# Patient Record
Sex: FEMALE | Race: WHITE | Employment: PART TIME | URBAN - METROPOLITAN AREA
[De-identification: names, ages, dates, MRNs, and addresses within clinical notes are randomized per-mention and may not be internally consistent; named-entity substitution may affect disease eponyms.]

---

## 2017-03-06 ENCOUNTER — LAB ENCOUNTER (OUTPATIENT)
Dept: LAB | Age: 27
End: 2017-03-06
Attending: FAMILY MEDICINE
Payer: COMMERCIAL

## 2017-03-06 ENCOUNTER — OFFICE VISIT (OUTPATIENT)
Dept: FAMILY MEDICINE CLINIC | Facility: CLINIC | Age: 27
End: 2017-03-06

## 2017-03-06 VITALS
TEMPERATURE: 99 F | RESPIRATION RATE: 18 BRPM | SYSTOLIC BLOOD PRESSURE: 112 MMHG | HEART RATE: 68 BPM | DIASTOLIC BLOOD PRESSURE: 66 MMHG | WEIGHT: 125 LBS | BODY MASS INDEX: 24 KG/M2

## 2017-03-06 DIAGNOSIS — R19.7 DIARRHEA, UNSPECIFIED TYPE: ICD-10-CM

## 2017-03-06 DIAGNOSIS — K58.0 IRRITABLE BOWEL SYNDROME WITH DIARRHEA: ICD-10-CM

## 2017-03-06 DIAGNOSIS — R10.12 LUQ ABDOMINAL PAIN: ICD-10-CM

## 2017-03-06 DIAGNOSIS — R10.12 LUQ ABDOMINAL PAIN: Primary | ICD-10-CM

## 2017-03-06 LAB
ALBUMIN SERPL-MCNC: 4.4 G/DL (ref 3.5–4.8)
ALP LIVER SERPL-CCNC: 55 U/L (ref 37–98)
ALT SERPL-CCNC: 17 U/L (ref 14–54)
AST SERPL-CCNC: 12 U/L (ref 15–41)
BASOPHILS # BLD AUTO: 0.04 X10(3) UL (ref 0–0.1)
BASOPHILS NFR BLD AUTO: 0.7 %
BILIRUB DIRECT SERPL-MCNC: 0.1 MG/DL (ref 0.1–0.5)
BILIRUB SERPL-MCNC: 0.3 MG/DL (ref 0.1–2)
BUN BLD-MCNC: 16 MG/DL (ref 8–20)
CALCIUM BLD-MCNC: 8.7 MG/DL (ref 8.3–10.3)
CHLORIDE: 106 MMOL/L (ref 101–111)
CO2: 28 MMOL/L (ref 22–32)
CREAT BLD-MCNC: 0.78 MG/DL (ref 0.55–1.02)
EOSINOPHIL # BLD AUTO: 0.11 X10(3) UL (ref 0–0.3)
EOSINOPHIL NFR BLD AUTO: 1.9 %
ERYTHROCYTE [DISTWIDTH] IN BLOOD BY AUTOMATED COUNT: 12.1 % (ref 11.5–16)
GLUCOSE BLD-MCNC: 68 MG/DL (ref 70–99)
HAV IGM SER QL: NONREACTIVE
HBV CORE IGM SER QL: NONREACTIVE
HBV SURFACE AG SERPL QL IA: NONREACTIVE
HCT VFR BLD AUTO: 41.4 % (ref 34–50)
HEPATITIS C VIRUS AB INTERPRETATION: NONREACTIVE
HGB BLD-MCNC: 13.7 G/DL (ref 12–16)
IMMATURE GRANULOCYTE COUNT: 0.01 X10(3) UL (ref 0–1)
IMMATURE GRANULOCYTE RATIO %: 0.2 %
IMMUNOGLOBULIN A: 268 MG/DL (ref 70–312)
LIPASE: 143 U/L (ref 73–393)
LYMPHOCYTES # BLD AUTO: 2.25 X10(3) UL (ref 0.9–4)
LYMPHOCYTES NFR BLD AUTO: 39.4 %
M PROTEIN MFR SERPL ELPH: 7.7 G/DL (ref 6.1–8.3)
MCH RBC QN AUTO: 30.9 PG (ref 27–33.2)
MCHC RBC AUTO-ENTMCNC: 33.1 G/DL (ref 31–37)
MCV RBC AUTO: 93.2 FL (ref 81–100)
MONOCYTES # BLD AUTO: 0.45 X10(3) UL (ref 0.1–0.6)
MONOCYTES NFR BLD AUTO: 7.9 %
NEUTROPHIL ABS PRELIM: 2.85 X10 (3) UL (ref 1.3–6.7)
NEUTROPHILS # BLD AUTO: 2.85 X10(3) UL (ref 1.3–6.7)
NEUTROPHILS NFR BLD AUTO: 49.9 %
PLATELET # BLD AUTO: 323 10(3)UL (ref 150–450)
POTASSIUM SERPL-SCNC: 3.7 MMOL/L (ref 3.6–5.1)
RBC # BLD AUTO: 4.44 X10(6)UL (ref 3.8–5.1)
RED CELL DISTRIBUTION WIDTH-SD: 42 FL (ref 35.1–46.3)
SODIUM SERPL-SCNC: 141 MMOL/L (ref 136–144)
WBC # BLD AUTO: 5.7 X10(3) UL (ref 4–13)

## 2017-03-06 PROCEDURE — 85025 COMPLETE CBC W/AUTO DIFF WBC: CPT

## 2017-03-06 PROCEDURE — 86255 FLUORESCENT ANTIBODY SCREEN: CPT

## 2017-03-06 PROCEDURE — 83516 IMMUNOASSAY NONANTIBODY: CPT

## 2017-03-06 PROCEDURE — 36415 COLL VENOUS BLD VENIPUNCTURE: CPT

## 2017-03-06 PROCEDURE — 82784 ASSAY IGA/IGD/IGG/IGM EACH: CPT

## 2017-03-06 PROCEDURE — 99214 OFFICE O/P EST MOD 30 MIN: CPT | Performed by: FAMILY MEDICINE

## 2017-03-06 PROCEDURE — 82248 BILIRUBIN DIRECT: CPT

## 2017-03-06 PROCEDURE — 86003 ALLG SPEC IGE CRUDE XTRC EA: CPT

## 2017-03-06 PROCEDURE — 80053 COMPREHEN METABOLIC PANEL: CPT

## 2017-03-06 PROCEDURE — 83690 ASSAY OF LIPASE: CPT

## 2017-03-06 PROCEDURE — 80074 ACUTE HEPATITIS PANEL: CPT

## 2017-03-06 RX ORDER — DEXTROAMPHETAMINE SACCHARATE, AMPHETAMINE ASPARTATE, DEXTROAMPHETAMINE SULFATE AND AMPHETAMINE SULFATE 5; 5; 5; 5 MG/1; MG/1; MG/1; MG/1
20 TABLET ORAL DAILY
Qty: 30 TABLET | Refills: 0 | Status: SHIPPED | OUTPATIENT
Start: 2017-03-06 | End: 2017-04-05

## 2017-03-06 RX ORDER — DEXTROAMPHETAMINE SACCHARATE, AMPHETAMINE ASPARTATE, DEXTROAMPHETAMINE SULFATE AND AMPHETAMINE SULFATE 5; 5; 5; 5 MG/1; MG/1; MG/1; MG/1
20 TABLET ORAL DAILY
Qty: 30 TABLET | Refills: 0 | Status: SHIPPED | OUTPATIENT
Start: 2017-04-05 | End: 2017-05-05

## 2017-03-06 RX ORDER — DEXTROAMPHETAMINE SACCHARATE, AMPHETAMINE ASPARTATE, DEXTROAMPHETAMINE SULFATE AND AMPHETAMINE SULFATE 5; 5; 5; 5 MG/1; MG/1; MG/1; MG/1
20 TABLET ORAL DAILY
Qty: 30 TABLET | Refills: 0 | Status: SHIPPED | OUTPATIENT
Start: 2017-05-05 | End: 2017-06-04

## 2017-03-06 RX ORDER — DEXTROAMPHETAMINE SACCHARATE, AMPHETAMINE ASPARTATE, DEXTROAMPHETAMINE SULFATE AND AMPHETAMINE SULFATE 5; 5; 5; 5 MG/1; MG/1; MG/1; MG/1
20 TABLET ORAL DAILY
COMMUNITY
End: 2018-06-13 | Stop reason: ALTCHOICE

## 2017-03-06 RX ORDER — DICYCLOMINE HYDROCHLORIDE 10 MG/1
10 CAPSULE ORAL 3 TIMES DAILY PRN
Qty: 40 CAPSULE | Refills: 3 | Status: SHIPPED | OUTPATIENT
Start: 2017-03-06 | End: 2017-03-16

## 2017-03-06 NOTE — PATIENT INSTRUCTIONS
Treating Diarrhea    Diarrhea happens when you have loose, watery, or frequent bowel movements. It is a common problem with many causes. Most cases of diarrhea clear up on their own. But certain cases may need treatment.  Be sure to see your healthcare pr © 9106-7481 [address], [address] [place]. All rights reserved. This information is not intended as a substitute for professional medical care. Always follow your healthcare professional's instructions.         Vira Lee · You may use acetaminophen or NSAID medicines like ibuprofen or naproxen to reduce pain and fever. Don’t use these if you have chronic liver or kidney disease, or ever had a stomach ulcer or gastrointestinal bleeding.  Don't use NSAID medicines if you are · Don’t force yourself to eat, especially if you are having cramping, vomiting, or diarrhea. Don’t eat large amounts at a time, even if you are hungry. · If you eat, avoid fatty, greasy, spicy, or fried foods.   · Don’t eat dairy foods or drink milk if you · Abdominal pain that gets worse  · Constant lower right abdominal pain  · Continued vomiting and inability to keep liquids down  · Diarrhea more than 5 times a day  · Blood in vomit or stool  · Dark urine or no urine for 8 hours, dry mouth and tongue, tir In general, a low-fiber diet means having fewer than 13 grams of fiber a day. Your provider may give you a list of things you can and can’t eat or drink. Read food labels. Choose foods and drinks that have as close to zero grams of fiber as possible.  Here Meats and protein (4 to 6 ounces daily)  · What to choose: tender, well-cooked meat, including ground meat, poultry, and fish; eggs; tofu; creamy peanut butter  · What to avoid: tough, chewy meat with gristle; peas, including split, yellow, and black-eyed;

## 2017-03-06 NOTE — PROGRESS NOTES
Elizabeth Braun is a 23year old female who presents for diarrhea. HPI:   Pt complains of diarrhea, watery, no blood,whole life, mild , no melena , associated with abdominal cramps. No travel, no sick contact, no suspicious food, no antibiotics. Worse w taste  LUNGS: denies shortness of breath with exertion  CARDIOVASCULAR: denies chest pain on exertion  GI: as above. :no dysuria.   MUSCULOSKELETAL: no myalgia  NEURO: denies headaches      EXAM:   /66 mmHg  Pulse 68  Temp(Src) 98.7 °F (37.1 °C) (O US ABDOMEN COMPLETE (CPT=76700); Future  -     INSURE FECAL GLOBIN by IMMUNOASSAY [[id number]]; Future    Irritable bowel syndrome with diarrhea  -     Dicyclomine HCl 10 MG Oral Cap; Take 1 capsule (10 mg total) by mouth 3 (three) times daily as needed.   -

## 2017-03-07 ENCOUNTER — LABORATORY ENCOUNTER (OUTPATIENT)
Dept: LAB | Age: 27
End: 2017-03-07
Attending: FAMILY MEDICINE
Payer: COMMERCIAL

## 2017-03-07 DIAGNOSIS — K58.0 IRRITABLE BOWEL SYNDROME WITH DIARRHEA: ICD-10-CM

## 2017-03-07 DIAGNOSIS — R19.7 DIARRHEA, UNSPECIFIED TYPE: ICD-10-CM

## 2017-03-07 DIAGNOSIS — R10.12 LUQ ABDOMINAL PAIN: ICD-10-CM

## 2017-03-07 PROCEDURE — 87046 STOOL CULTR AEROBIC BACT EA: CPT

## 2017-03-07 PROCEDURE — 87269 GIARDIA AG IF: CPT

## 2017-03-07 PROCEDURE — 87177 OVA AND PARASITES SMEARS: CPT

## 2017-03-07 PROCEDURE — 87209 SMEAR COMPLEX STAIN: CPT

## 2017-03-07 PROCEDURE — 87045 FECES CULTURE AEROBIC BACT: CPT

## 2017-03-07 PROCEDURE — 87015 SPECIMEN INFECT AGNT CONCNTJ: CPT

## 2017-03-07 PROCEDURE — 87427 SHIGA-LIKE TOXIN AG IA: CPT

## 2017-03-08 ENCOUNTER — APPOINTMENT (OUTPATIENT)
Dept: LAB | Age: 27
End: 2017-03-08
Attending: FAMILY MEDICINE
Payer: COMMERCIAL

## 2017-03-08 DIAGNOSIS — K58.0 IRRITABLE BOWEL SYNDROME WITH DIARRHEA: ICD-10-CM

## 2017-03-08 DIAGNOSIS — R19.7 DIARRHEA, UNSPECIFIED TYPE: ICD-10-CM

## 2017-03-08 DIAGNOSIS — R10.12 LUQ ABDOMINAL PAIN: ICD-10-CM

## 2017-03-08 LAB
GLIADIN PEPTIDE ANTIBODY, IGA DEAMIDATED: 5 UNITS
GLIADIN PEPTIDE ANTIBODY, IGG DEAMIDATED: 3 UNITS

## 2017-03-08 PROCEDURE — 82272 OCCULT BLD FECES 1-3 TESTS: CPT

## 2017-03-08 PROCEDURE — 87338 HPYLORI STOOL AG IA: CPT

## 2017-03-09 ENCOUNTER — TELEPHONE (OUTPATIENT)
Dept: FAMILY MEDICINE CLINIC | Facility: CLINIC | Age: 27
End: 2017-03-09

## 2017-03-09 LAB
ALLERGEN,  SHRIMP IGE: <0.1 KU/L
ALLERGEN, CLAM IGE: <0.1 KU/L
ALLERGEN, CODFISH: <0.1 KU/L
ALLERGEN, CORN IGE: <0.1 KU/L
ALLERGEN, EGG WHITE IGE: <0.1 KU/L
ALLERGEN, MILK (COW) IGE: <0.1 KU/L
ALLERGEN, PEANUT IGE: <0.1 KU/L
ALLERGEN, SCALLOP IGE: <0.1 KU/L
ALLERGEN, SOYBEAN IGE: <0.1 KU/L
ALLERGEN, WALNUT/BLACK WALNUT: <0.1 KU/L
ALLERGEN, WHEAT IGE: <0.1 KU/L
IMMUNOGLOBULIN E: 16 KU/L
TISSUE TRANSGLUTAMINASE AB,IGA: 3.7 U/ML (ref ?–15)

## 2017-03-09 NOTE — TELEPHONE ENCOUNTER
[address] [address] Rosario Mcallister for note.  Ok to see:    [address]  Gastroenterology    [address], [address]  [address]  [place], [address] [place] [address]    [address] [facility],[place] [address], #

## 2017-03-09 NOTE — TELEPHONE ENCOUNTER
Called patient and spoke with her. Advised patient of results below. Patient states understanding. Patient states that she also needs a note for work today. Patient states that she called in today due to her stomach pain.   Please advise if okay for wor

## 2017-03-10 ENCOUNTER — HOSPITAL ENCOUNTER (OUTPATIENT)
Dept: ULTRASOUND IMAGING | Age: 27
Discharge: HOME | End: 2017-03-10
Attending: FAMILY MEDICINE
Payer: COMMERCIAL

## 2017-03-10 DIAGNOSIS — R19.7 DIARRHEA, UNSPECIFIED TYPE: ICD-10-CM

## 2017-03-10 DIAGNOSIS — K58.0 IRRITABLE BOWEL SYNDROME WITH DIARRHEA: ICD-10-CM

## 2017-03-10 DIAGNOSIS — R10.12 LUQ ABDOMINAL PAIN: ICD-10-CM

## 2017-03-10 LAB
HELICOBACTER PYLORI AG, FECAL: NEGATIVE
OVA AND PARASITE, TRICHROME STAIN: NEGATIVE
OVA AND PARASITE, WET MOUNT: NEGATIVE

## 2017-03-10 PROCEDURE — 76700 US EXAM ABDOM COMPLETE: CPT

## 2017-03-10 NOTE — TELEPHONE ENCOUNTER
Pt came in to  work note. Note & Dr. Michael Peck office info given to pt. All questions answered, pt expresses understanding.

## 2018-02-08 ENCOUNTER — TELEPHONE (OUTPATIENT)
Dept: FAMILY MEDICINE CLINIC | Facility: CLINIC | Age: 28
End: 2018-02-08

## 2018-02-08 NOTE — TELEPHONE ENCOUNTER
Russel was in to see doctor fr her Adderal and is calling stating that someone needs to call her insurance company and state that patient needs this medication.  I said that would be a PA form and she said \"no\" this happened last time and someone just roshni

## 2018-02-08 NOTE — PROGRESS NOTES
Isaiah Sheldon is a 33year old female. Patient presents with:  Medication Request: f/u ADHD      HPI:   Patient has been struggling at work. Finds it difficult to concentrate so tough completing tasks. Feels that job may be in danger.   Has previou supple  LUNGS: clear to auscultation  CARDIO: RRR without murmur  GI: good BS's,no masses, HSM or tenderness  EXTREMITIES: no cyanosis, clubbing or edema  PSYCH: normal mood and affect, no hallucinations, no SI, no HI. Normal insight/judjment.       ASSESSME

## 2018-02-12 NOTE — TELEPHONE ENCOUNTER
Message left for pt to call office back to confirm which pharmacy she took her Adderall prescriptions to so I can call then to proceed with her Adderall PA. [facility] called, pt has not filled Adderall since 2/2017.

## 2018-02-26 NOTE — TELEPHONE ENCOUNTER
I spoke to [facility].  PA phone # is [phone]. Pharmacy ID [id number]. Re Mendez called to initiate PA for Adderall. I spoke with Van Jensen, she will fax me form to complete PA. Will await form.

## 2018-02-26 NOTE — TELEPHONE ENCOUNTER
Patient called back stating she brought the scripts to [facility] in [place] on [address] She states she called back about this already and that the pharmacy has faxed us 4 times for approval.     [phone]- Patient can talk on this number,.

## 2018-02-28 NOTE — TELEPHONE ENCOUNTER
Fax received from [email or web address] is approved 2/27/18-2/27/19. [facility] notified of this & request they fill pt's Rx for her.

## 2018-05-03 RX ORDER — DEXTROAMPHETAMINE SACCHARATE, AMPHETAMINE ASPARTATE, DEXTROAMPHETAMINE SULFATE AND AMPHETAMINE SULFATE 5; 5; 5; 5 MG/1; MG/1; MG/1; MG/1
20 TABLET ORAL DAILY
Qty: 30 TABLET | Refills: 0 | Status: SHIPPED | OUTPATIENT
Start: 2018-05-03 | End: 2018-06-05

## 2018-05-03 NOTE — TELEPHONE ENCOUNTER
Pt wants to know if  can refill her adderall, she has 3 more pills left till Sunday. Pt had an appt. For tomorrow but we had to cancel. Please call Pt on her cell if Dr. Lourdes Stover can refill until next appt.

## 2018-06-05 RX ORDER — DEXTROAMPHETAMINE SACCHARATE, AMPHETAMINE ASPARTATE, DEXTROAMPHETAMINE SULFATE AND AMPHETAMINE SULFATE 5; 5; 5; 5 MG/1; MG/1; MG/1; MG/1
20 TABLET ORAL DAILY
Qty: 30 TABLET | Refills: 0 | Status: SHIPPED | OUTPATIENT
Start: 2018-06-05 | End: 2018-07-05

## 2018-06-05 NOTE — TELEPHONE ENCOUNTER
Called the patient and left a message on the voice mail informing that the prescription is ready for .

## 2018-06-05 NOTE — TELEPHONE ENCOUNTER
Patient needs a refill of Adderall. Patient has an appointment on 6/13/18 but is out of the medication. Please advise.

## 2018-06-13 NOTE — PROGRESS NOTES
Kay Canada is a 33year old female. Patient presents with:  ADD: f/u      HPI:   Patient returns for recheck of ADHD treatment. Has been using the stimulant medication on a regular basis. Feels the medication has been helping improve focus.    Is loss, tremor, and anxiety. 15 min cousneling. Patient understands and agrees to the above plan. Signed Prescriptions Disp Refills    Amphetamine Sulfate 10 MG Oral Tab 60 tablet 0      Sig: Take 1 tablet by mouth 2 (two) times daily.          Patient un

## 2018-06-14 ENCOUNTER — TELEPHONE (OUTPATIENT)
Dept: FAMILY MEDICINE CLINIC | Facility: CLINIC | Age: 28
End: 2018-06-14

## 2018-06-14 NOTE — TELEPHONE ENCOUNTER
Pt called stating that she rec'd the wrong rx yesterday She needs amphetamine-dextroamphetamine (ADDERALL) 20 MG Oral Tab   Pt states they don't make the 20mg anymore so she needs (2) 10mg. Please call when ready for .

## 2018-06-14 NOTE — TELEPHONE ENCOUNTER
Patient received prescription for Adderall 20mg on June 5th. I spoke with Darryle Falter at [address] [place] [address] and this was filled and picked up by patient on June 8th.  Patient received Adderall 10mg to be taken BID that she tried to fill but they gave her the prescription antonio

## 2018-06-17 NOTE — TELEPHONE ENCOUNTER
Patient came in to  Rx, but was informed that she never called back. Patient stated she called back, but office was closed.   Told the patient I would make a note of her coming in and advised her to call the office 6/18, Monday when the family pract

## 2018-07-01 ENCOUNTER — OFFICE VISIT (OUTPATIENT)
Dept: FAMILY MEDICINE CLINIC | Facility: CLINIC | Age: 28
End: 2018-07-01

## 2018-07-01 VITALS
SYSTOLIC BLOOD PRESSURE: 122 MMHG | RESPIRATION RATE: 20 BRPM | DIASTOLIC BLOOD PRESSURE: 52 MMHG | BODY MASS INDEX: 26 KG/M2 | WEIGHT: 135 LBS | TEMPERATURE: 99 F | HEART RATE: 92 BPM | OXYGEN SATURATION: 98 %

## 2018-07-01 DIAGNOSIS — J01.00 ACUTE NON-RECURRENT MAXILLARY SINUSITIS: Primary | ICD-10-CM

## 2018-07-01 PROCEDURE — 99213 OFFICE O/P EST LOW 20 MIN: CPT | Performed by: NURSE PRACTITIONER

## 2018-07-01 RX ORDER — AMOXICILLIN AND CLAVULANATE POTASSIUM 875; 125 MG/1; MG/1
1 TABLET, FILM COATED ORAL 2 TIMES DAILY
Qty: 14 TABLET | Refills: 0 | Status: SHIPPED | OUTPATIENT
Start: 2018-07-01 | End: 2018-07-08

## 2018-07-01 NOTE — PROGRESS NOTES
CHIEF COMPLAINT:   Patient presents with:  Cough: post nasal drip, sinus pain [id number]    HPI:   Emily Melendrez is a 33year old female who presents for sinus congestion for  10  days. Symptoms have been worsening since onset.  Sinus congestion/pain is desc /52   Pulse 92   Temp 98.5 °F (36.9 °C) (Oral)   Resp 20   Wt 135 lb   LMP 06/01/2018   SpO2 98%   Breastfeeding?  Unknown   BMI 26.37 kg/m²   GENERAL: well developed, well nourished,in no apparent distress  SKIN: no rashes,no suspicious lesions  HEAD The sinuses are air-filled spaces within the bones of the face. They connect to the inside of the nose. Sinusitis is an inflammation of the tissue lining the sinus cavity. Sinus inflammation can occur during a cold.  It can also be due to allergies to polle · Do not use nasal rinses or irrigation during an acute sinus infection, unless told to by your health care provider. Rinsing may spread the infection to other sinuses.   · Use acetaminophen or ibuprofen to control pain, unless another pain medicine was pre

## 2018-07-12 ENCOUNTER — APPOINTMENT (OUTPATIENT)
Dept: LAB | Age: 28
End: 2018-07-12
Attending: FAMILY MEDICINE
Payer: COMMERCIAL

## 2018-07-12 ENCOUNTER — OFFICE VISIT (OUTPATIENT)
Dept: FAMILY MEDICINE CLINIC | Facility: CLINIC | Age: 28
End: 2018-07-12

## 2018-07-12 VITALS
SYSTOLIC BLOOD PRESSURE: 100 MMHG | HEIGHT: 60 IN | HEART RATE: 73 BPM | WEIGHT: 132 LBS | BODY MASS INDEX: 25.91 KG/M2 | OXYGEN SATURATION: 97 % | RESPIRATION RATE: 16 BRPM | TEMPERATURE: 99 F | DIASTOLIC BLOOD PRESSURE: 60 MMHG

## 2018-07-12 DIAGNOSIS — Z13.0 SCREENING FOR ENDOCRINE, NUTRITIONAL, METABOLIC AND IMMUNITY DISORDER: Primary | ICD-10-CM

## 2018-07-12 DIAGNOSIS — Z13.21 SCREENING FOR ENDOCRINE, NUTRITIONAL, METABOLIC AND IMMUNITY DISORDER: Primary | ICD-10-CM

## 2018-07-12 DIAGNOSIS — F98.8 ATTENTION DEFICIT DISORDER (ADD) WITHOUT HYPERACTIVITY: ICD-10-CM

## 2018-07-12 DIAGNOSIS — Z13.29 SCREENING FOR ENDOCRINE, NUTRITIONAL, METABOLIC AND IMMUNITY DISORDER: Primary | ICD-10-CM

## 2018-07-12 DIAGNOSIS — Z13.228 SCREENING FOR ENDOCRINE, NUTRITIONAL, METABOLIC AND IMMUNITY DISORDER: Primary | ICD-10-CM

## 2018-07-12 LAB
BARBITURATES URINE: NEGATIVE
BENZODIAZEPINES URINE: NEGATIVE
CANNABINOID URINE: NEGATIVE
COCAINE URINE: NEGATIVE
ETHYL ALCOHOL, QUALITATIVE: NEGATIVE
OPIATE URINE: NEGATIVE
PCP URINE: NEGATIVE

## 2018-07-12 PROCEDURE — 99214 OFFICE O/P EST MOD 30 MIN: CPT | Performed by: FAMILY MEDICINE

## 2018-07-12 PROCEDURE — 80307 DRUG TEST PRSMV CHEM ANLYZR: CPT | Performed by: FAMILY MEDICINE

## 2018-07-12 RX ORDER — AMPHETAMINE SULFATE 10 MG/1
TABLET ORAL
Qty: 60 TABLET | Refills: 0 | Status: CANCELLED | OUTPATIENT
Start: 2018-07-12 | End: 2018-08-11

## 2018-07-12 RX ORDER — DEXTROAMPHETAMINE SACCHARATE, AMPHETAMINE ASPARTATE, DEXTROAMPHETAMINE SULFATE AND AMPHETAMINE SULFATE 2.5; 2.5; 2.5; 2.5 MG/1; MG/1; MG/1; MG/1
10 TABLET ORAL 2 TIMES DAILY
Qty: 60 TABLET | Refills: 0 | Status: SHIPPED | OUTPATIENT
Start: 2018-08-11 | End: 2018-07-19

## 2018-07-12 RX ORDER — DEXTROAMPHETAMINE SACCHARATE, AMPHETAMINE ASPARTATE, DEXTROAMPHETAMINE SULFATE AND AMPHETAMINE SULFATE 2.5; 2.5; 2.5; 2.5 MG/1; MG/1; MG/1; MG/1
10 TABLET ORAL 2 TIMES DAILY
Qty: 60 TABLET | Refills: 0 | Status: SHIPPED | OUTPATIENT
Start: 2018-07-12 | End: 2018-07-19

## 2018-07-12 RX ORDER — DEXTROAMPHETAMINE SACCHARATE, AMPHETAMINE ASPARTATE, DEXTROAMPHETAMINE SULFATE AND AMPHETAMINE SULFATE 2.5; 2.5; 2.5; 2.5 MG/1; MG/1; MG/1; MG/1
10 TABLET ORAL 2 TIMES DAILY
Qty: 60 TABLET | Refills: 0 | Status: SHIPPED | OUTPATIENT
Start: 2018-09-10 | End: 2018-07-19

## 2018-07-12 RX ORDER — AMPHETAMINE SULFATE 10 MG/1
10 TABLET ORAL 2 TIMES DAILY
Qty: 60 TABLET | Refills: 0 | Status: CANCELLED | OUTPATIENT
Start: 2018-07-12 | End: 2018-08-11

## 2018-07-12 RX ORDER — AMPHETAMINE SULFATE 10 MG/1
1 TABLET ORAL 2 TIMES DAILY
Qty: 60 TABLET | Refills: 0 | Status: CANCELLED | OUTPATIENT
Start: 2018-07-12 | End: 2018-08-11

## 2018-07-12 NOTE — PROGRESS NOTES
Chucky Beck is a 33year old female. Patient presents with:  ADD: f/u      HPI:   Patient returns for recheck of ADHD treatment. Has been using the stimulant medication on a regular basis. Feels the medication has been helping improve focus.    Is edema  PSYCH: normal mood and affect, no hallucinations, no SI, no HI. Normal insight/judjment. ASSESSMENT AND PLAN:   ADHD -stable.   Side effects and risks of medication explained in detail including but not limited to anorexia, weight loss, tremor, a

## 2018-07-16 ENCOUNTER — TELEPHONE (OUTPATIENT)
Dept: FAMILY MEDICINE CLINIC | Facility: CLINIC | Age: 28
End: 2018-07-16

## 2018-11-05 ENCOUNTER — OFFICE VISIT (OUTPATIENT)
Dept: FAMILY MEDICINE CLINIC | Facility: CLINIC | Age: 28
End: 2018-11-05
Payer: COMMERCIAL

## 2018-11-05 VITALS
DIASTOLIC BLOOD PRESSURE: 62 MMHG | OXYGEN SATURATION: 99 % | HEART RATE: 64 BPM | SYSTOLIC BLOOD PRESSURE: 102 MMHG | TEMPERATURE: 98 F | RESPIRATION RATE: 20 BRPM | BODY MASS INDEX: 25.91 KG/M2 | HEIGHT: 60 IN | WEIGHT: 132 LBS

## 2018-11-05 DIAGNOSIS — J06.9 VIRAL URI: Primary | ICD-10-CM

## 2018-11-05 DIAGNOSIS — H92.03 EAR PAIN, BILATERAL: ICD-10-CM

## 2018-11-05 PROCEDURE — 99213 OFFICE O/P EST LOW 20 MIN: CPT | Performed by: PHYSICIAN ASSISTANT

## 2018-11-05 NOTE — PROGRESS NOTES
CHIEF COMPLAINT:   Patient presents with:  Ear Pain    HPI:   Marychuy Mchugh is a 33year old female who presents to clinic today with complaints of bilateral ear pain/\"burning\". Has had for 2  days. Home treatments include: none.   Needs note for wor EARS: Tragus non tender on palpation bilaterally. External auditory canals healthy. Right TM: pearly, no bulging, no retraction, no effusion, bony landmarks sharp. Left TM: pearly, no bulging, no retraction, no effusion, bony landmarks sharp.   NOSE: nostr · You may use acetaminophen or ibuprofen to control pain and fever, unless another medicine was prescribed. If you have chronic liver or kidney disease, have ever had a stomach ulcer or gastrointestinal bleeding, or are taking blood-thinning medicines, elsie Earaches can happen without an infection. This occurs when air and fluid build up behind the eardrum causing a feeling of fullness and discomfort and reduced hearing. This is called otitis media with effusion (OME) or serous otitis media.  It means there is · You may use over-the-counter decongestants such as phenylephrine or pseudoephedrine. But they are not always helpful. Don't use nasal spray decongestants more than 3 days. Longer use can make congestion worse.  Prescription nasal sprays from your doctor d

## 2018-11-05 NOTE — PATIENT INSTRUCTIONS
Viral Upper Respiratory Illness (Adult)  You have a viral upper respiratory illness (URI), which is another term for the common cold. This illness is contagious during the first few days. It is spread through the air by coughing and sneezing.  It may also Call your healthcare provider right away if any of these occur:  · Cough with lots of colored sputum (mucus)  · Severe headache; face, neck, or ear pain  · Difficulty swallowing due to throat pain  · Fever of 100.4°F (38°C) or higher, or as directed by you If your OME doesn't improve after 3 months, surgery may be used to drain the fluid and insert a small tube in the eardrum to allow continued drainage.   Because the middle ear fluid can become infected, it is important to watch for signs of an ear infection © 4310-8884 [address] [facility], [facility] [place]. All rights reserved. This information is not intended as a substitute for professional medical care. Always follow your healthcare professional's instructions.

## 2018-12-11 ENCOUNTER — OFFICE VISIT (OUTPATIENT)
Dept: FAMILY MEDICINE CLINIC | Facility: CLINIC | Age: 28
End: 2018-12-11
Payer: COMMERCIAL

## 2018-12-11 VITALS
RESPIRATION RATE: 16 BRPM | BODY MASS INDEX: 25.84 KG/M2 | SYSTOLIC BLOOD PRESSURE: 104 MMHG | OXYGEN SATURATION: 97 % | WEIGHT: 131.63 LBS | HEART RATE: 78 BPM | HEIGHT: 59.75 IN | TEMPERATURE: 98 F | DIASTOLIC BLOOD PRESSURE: 66 MMHG

## 2018-12-11 DIAGNOSIS — J03.90 TONSILLITIS: ICD-10-CM

## 2018-12-11 DIAGNOSIS — J02.9 SORE THROAT: Primary | ICD-10-CM

## 2018-12-11 PROCEDURE — 87880 STREP A ASSAY W/OPTIC: CPT | Performed by: NURSE PRACTITIONER

## 2018-12-11 PROCEDURE — 99213 OFFICE O/P EST LOW 20 MIN: CPT | Performed by: NURSE PRACTITIONER

## 2018-12-11 PROCEDURE — 87081 CULTURE SCREEN ONLY: CPT | Performed by: NURSE PRACTITIONER

## 2018-12-11 RX ORDER — AMOXICILLIN 875 MG/1
875 TABLET, COATED ORAL 2 TIMES DAILY
Qty: 14 TABLET | Refills: 0 | Status: SHIPPED | OUTPATIENT
Start: 2018-12-11 | End: 2018-12-18

## 2018-12-11 NOTE — PROGRESS NOTES
[facility]   PROGRESS NOTE  Chief Complaint:   Patient presents with:  Cough: x 5 days  Nasal Congestion: x 5 days  Sore Throat: x 5 days      HPI:   This is a 33year old female coming in for sore throat     Sore Throat : Patient presents with s (ADDERALL XR) 5 MG Oral Capsule SR 24 Hr Take 1 capsule (5 mg total) by mouth daily. Disp: 30 capsule Rfl: 0   ClonazePAM 0.5 MG Oral Tab Take 1 tablet (0.5 mg total) by mouth nightly as needed for Anxiety.  Disp: 30 tablet Rfl: 2      Ready to quit: Not An bruising, good turgor. HEART:  Regular rate and rhythm, no murmurs, rubs or gallops. LUNGS: Clear to auscultation bilterally, no rales/rhonchi/wheezing.   ABDOMEN:  Soft, nondistended, nontender, bowel sounds normal in all 4 quadrants  EXTREMITIES:  No ed

## 2018-12-17 ENCOUNTER — TELEPHONE (OUTPATIENT)
Dept: FAMILY MEDICINE CLINIC | Facility: CLINIC | Age: 28
End: 2018-12-17

## 2019-03-26 ENCOUNTER — OFFICE VISIT (OUTPATIENT)
Dept: FAMILY MEDICINE CLINIC | Facility: CLINIC | Age: 29
End: 2019-03-26
Payer: COMMERCIAL

## 2019-03-26 VITALS
OXYGEN SATURATION: 100 % | TEMPERATURE: 99 F | HEART RATE: 64 BPM | BODY MASS INDEX: 25.52 KG/M2 | RESPIRATION RATE: 18 BRPM | DIASTOLIC BLOOD PRESSURE: 64 MMHG | SYSTOLIC BLOOD PRESSURE: 110 MMHG | HEIGHT: 59.75 IN | WEIGHT: 130 LBS

## 2019-03-26 DIAGNOSIS — Z13.228 SCREENING FOR ENDOCRINE, NUTRITIONAL, METABOLIC AND IMMUNITY DISORDER: ICD-10-CM

## 2019-03-26 DIAGNOSIS — Z00.00 ROUTINE GENERAL MEDICAL EXAMINATION AT A HEALTH CARE FACILITY: Primary | ICD-10-CM

## 2019-03-26 DIAGNOSIS — Z13.0 SCREENING FOR ENDOCRINE, NUTRITIONAL, METABOLIC AND IMMUNITY DISORDER: ICD-10-CM

## 2019-03-26 DIAGNOSIS — Z13.21 SCREENING FOR ENDOCRINE, NUTRITIONAL, METABOLIC AND IMMUNITY DISORDER: ICD-10-CM

## 2019-03-26 DIAGNOSIS — Z11.1 SCREENING-PULMONARY TB: ICD-10-CM

## 2019-03-26 DIAGNOSIS — Z13.29 SCREENING FOR ENDOCRINE, NUTRITIONAL, METABOLIC AND IMMUNITY DISORDER: ICD-10-CM

## 2019-03-26 PROCEDURE — 86580 TB INTRADERMAL TEST: CPT | Performed by: FAMILY MEDICINE

## 2019-03-26 PROCEDURE — 99395 PREV VISIT EST AGE 18-39: CPT | Performed by: FAMILY MEDICINE

## 2019-03-26 NOTE — PROGRESS NOTES
Isaiah Bravo is a 33year old female who presents for a complete physical exam, no gyn. HPI:     Patient presents with:  Physical: PPD      Patient feels well, dental visit up to date, no hearing problem. Vaccinations up to date. Exercise: none. urination; denies unexpected wt gain or wt loss  ALLERGY/IMM.: denies food or seasonal allergies  PSYCH: no symptoms of depression or anxiety      EXAM:   /64   Pulse 64   Temp 98.6 °F (37 °C) (Oral)   Resp 18   Ht 59.75\"   Wt 130 lb   LMP 03/22/201

## 2019-03-29 ENCOUNTER — NURSE ONLY (OUTPATIENT)
Dept: FAMILY MEDICINE CLINIC | Facility: CLINIC | Age: 29
End: 2019-03-29
Payer: COMMERCIAL

## 2019-03-29 LAB — INDURATION (): 0 MM (ref 0–11)

## 2019-04-29 ENCOUNTER — OFFICE VISIT (OUTPATIENT)
Dept: FAMILY MEDICINE CLINIC | Facility: CLINIC | Age: 29
End: 2019-04-29
Payer: COMMERCIAL

## 2019-04-29 VITALS
RESPIRATION RATE: 16 BRPM | WEIGHT: 135 LBS | HEART RATE: 81 BPM | DIASTOLIC BLOOD PRESSURE: 58 MMHG | TEMPERATURE: 99 F | BODY MASS INDEX: 27 KG/M2 | SYSTOLIC BLOOD PRESSURE: 108 MMHG | OXYGEN SATURATION: 98 %

## 2019-04-29 DIAGNOSIS — J06.9 VIRAL UPPER RESPIRATORY TRACT INFECTION: Primary | ICD-10-CM

## 2019-04-29 PROCEDURE — 99213 OFFICE O/P EST LOW 20 MIN: CPT | Performed by: NURSE PRACTITIONER

## 2019-04-29 NOTE — PROGRESS NOTES
CHIEF COMPLAINT:   Patient presents with:  Cough: cough, runny nose, congestion, chest pressure when cough, ear pain, fever, bodyaches, chills, wants doctors note for work, x 5 days    HPI:   Nicholas Kothari is a 33year old female who presents for ill sym /58 (BP Location: Right arm, Patient Position: Sitting, Cuff Size: adult)   Pulse 81   Temp 98.5 °F (36.9 °C) (Oral)   Resp 16   Wt 135 lb   LMP 04/15/2019   SpO2 98%   BMI 26.59 kg/m²   GENERAL: well developed, well nourished, in no apparent distres · Avoid being exposed to cigarette smoke (yours or others’).   · You may use acetaminophen or ibuprofen to control pain and fever, unless another medicine was prescribed. If you have chronic liver or kidney disease, have ever had a stomach ulcer or gastroin The patient is asked to return if sx's persist or worsen.

## 2021-06-02 ENCOUNTER — OFFICE VISIT (OUTPATIENT)
Dept: FAMILY MEDICINE CLINIC | Facility: CLINIC | Age: 31
End: 2021-06-02

## 2021-06-02 VITALS
HEART RATE: 72 BPM | DIASTOLIC BLOOD PRESSURE: 70 MMHG | WEIGHT: 142 LBS | SYSTOLIC BLOOD PRESSURE: 100 MMHG | HEIGHT: 60 IN | OXYGEN SATURATION: 98 % | RESPIRATION RATE: 16 BRPM | BODY MASS INDEX: 27.88 KG/M2 | TEMPERATURE: 99 F

## 2021-06-02 DIAGNOSIS — Z01.84 IMMUNITY STATUS TESTING: ICD-10-CM

## 2021-06-02 DIAGNOSIS — Z11.1 PPD SCREENING TEST: ICD-10-CM

## 2021-06-02 DIAGNOSIS — Z23 NEED FOR TDAP VACCINATION: ICD-10-CM

## 2021-06-02 DIAGNOSIS — Z00.00 ROUTINE GENERAL MEDICAL EXAMINATION AT A HEALTH CARE FACILITY: Primary | ICD-10-CM

## 2021-06-02 PROCEDURE — 99395 PREV VISIT EST AGE 18-39: CPT | Performed by: NURSE PRACTITIONER

## 2021-06-02 PROCEDURE — 90471 IMMUNIZATION ADMIN: CPT | Performed by: NURSE PRACTITIONER

## 2021-06-02 PROCEDURE — 3074F SYST BP LT 130 MM HG: CPT | Performed by: NURSE PRACTITIONER

## 2021-06-02 PROCEDURE — 3078F DIAST BP <80 MM HG: CPT | Performed by: NURSE PRACTITIONER

## 2021-06-02 PROCEDURE — 3008F BODY MASS INDEX DOCD: CPT | Performed by: NURSE PRACTITIONER

## 2021-06-02 PROCEDURE — 90715 TDAP VACCINE 7 YRS/> IM: CPT | Performed by: NURSE PRACTITIONER

## 2021-06-07 ENCOUNTER — TELEPHONE (OUTPATIENT)
Dept: FAMILY MEDICINE CLINIC | Facility: CLINIC | Age: 31
End: 2021-06-07

## 2021-06-07 ENCOUNTER — MED REC SCAN ONLY (OUTPATIENT)
Dept: FAMILY MEDICINE CLINIC | Facility: CLINIC | Age: 31
End: 2021-06-07

## 2021-06-07 NOTE — TELEPHONE ENCOUNTER
Left message for patient to call office. Need to inform patient her physical form is complete and to go over MMR titer results. Copy will be in Ausra's pending folder.      MMR results:  Mumps Antibody- 1.4- Positive  Rubella Antibodies - Reactive- 38.2

## 2021-11-29 ENCOUNTER — TELEPHONE (OUTPATIENT)
Dept: FAMILY MEDICINE CLINIC | Facility: CLINIC | Age: 31
End: 2021-11-29

## 2021-11-29 ENCOUNTER — OFFICE VISIT (OUTPATIENT)
Dept: FAMILY MEDICINE CLINIC | Facility: CLINIC | Age: 31
End: 2021-11-29

## 2021-11-29 VITALS
DIASTOLIC BLOOD PRESSURE: 70 MMHG | BODY MASS INDEX: 25.13 KG/M2 | WEIGHT: 128 LBS | OXYGEN SATURATION: 100 % | SYSTOLIC BLOOD PRESSURE: 110 MMHG | HEART RATE: 92 BPM | RESPIRATION RATE: 16 BRPM | HEIGHT: 60 IN

## 2021-11-29 DIAGNOSIS — N63.0 BREAST LUMP IN FEMALE: Primary | ICD-10-CM

## 2021-11-29 DIAGNOSIS — Z80.3 FAMILY HISTORY OF BREAST CANCER: ICD-10-CM

## 2021-11-29 PROCEDURE — 99214 OFFICE O/P EST MOD 30 MIN: CPT | Performed by: NURSE PRACTITIONER

## 2021-11-29 PROCEDURE — 3078F DIAST BP <80 MM HG: CPT | Performed by: NURSE PRACTITIONER

## 2021-11-29 PROCEDURE — 3008F BODY MASS INDEX DOCD: CPT | Performed by: NURSE PRACTITIONER

## 2021-11-29 PROCEDURE — 3074F SYST BP LT 130 MM HG: CPT | Performed by: NURSE PRACTITIONER

## 2021-11-29 NOTE — PATIENT INSTRUCTIONS
Breast Anatomy  Breasts come in all shapes and sizes. But they all share the same features. You can learn about the parts, or (anatomy) of your breasts. This will help you know what you're seeing and feeling.  Then you can learn what's normal for your dean

## 2021-11-29 NOTE — TELEPHONE ENCOUNTER
Patient calling states was seen today has 2 orders patient is requesting both orders to be faxed to  [address] [place] [address] fax# [phone]  Can not make appt until orders are faxed

## 2021-11-29 NOTE — PROGRESS NOTES
Subjective:   Charmaine Chew is a 28year old female who presents for Breast Pain (left breast lump, ) Stated that her boyfriend alerted her to a bump in her left breast 2 weeks ago.  States that the lump was the size of a golf ball but has now reduced in normal.      Left Ear: Tympanic membrane normal.      Nose: Nose normal.      Mouth/Throat:      Mouth: Mucous membranes are moist.      Pharynx: Oropharynx is clear. Eyes:      Pupils: Pupils are equal, round, and reactive to light.    Cardiovascular:

## 2021-11-30 ENCOUNTER — TELEPHONE (OUTPATIENT)
Dept: FAMILY MEDICINE CLINIC | Facility: CLINIC | Age: 31
End: 2021-11-30

## 2022-02-17 ENCOUNTER — TELEPHONE (OUTPATIENT)
Dept: FAMILY MEDICINE CLINIC | Facility: CLINIC | Age: 32
End: 2022-02-17

## 2022-02-17 NOTE — TELEPHONE ENCOUNTER
Left message for patient to call office. Received information from [facility], that patient never scheduled a mammogram regarding breast lump. Wanted to reach out to patient regarding symptoms.

## 2022-06-21 ENCOUNTER — PATIENT MESSAGE (OUTPATIENT)
Dept: FAMILY MEDICINE CLINIC | Facility: CLINIC | Age: 32
End: 2022-06-21

## 2022-06-21 RX ORDER — VALACYCLOVIR HYDROCHLORIDE 1 G/1
1000 TABLET, FILM COATED ORAL EVERY 8 HOURS
Qty: 21 TABLET | Refills: 0 | Status: SHIPPED | OUTPATIENT
Start: 2022-06-21 | End: 2022-06-28

## 2022-06-21 NOTE — TELEPHONE ENCOUNTER
Patient requesting Vlatrex for cold sores. Has taken Abreva, but it takes to long to work. Please advise.

## 2022-08-28 ENCOUNTER — OFFICE VISIT (OUTPATIENT)
Dept: FAMILY MEDICINE CLINIC | Facility: CLINIC | Age: 32
End: 2022-08-28
Payer: COMMERCIAL

## 2022-08-28 VITALS
RESPIRATION RATE: 20 BRPM | SYSTOLIC BLOOD PRESSURE: 119 MMHG | TEMPERATURE: 99 F | HEART RATE: 79 BPM | DIASTOLIC BLOOD PRESSURE: 73 MMHG | OXYGEN SATURATION: 97 %

## 2022-08-28 DIAGNOSIS — J01.00 ACUTE MAXILLARY SINUSITIS, RECURRENCE NOT SPECIFIED: Primary | ICD-10-CM

## 2022-08-28 PROCEDURE — 3078F DIAST BP <80 MM HG: CPT

## 2022-08-28 PROCEDURE — 3074F SYST BP LT 130 MM HG: CPT

## 2022-08-28 PROCEDURE — 99213 OFFICE O/P EST LOW 20 MIN: CPT

## 2022-08-28 RX ORDER — AMOXICILLIN 875 MG/1
875 TABLET, COATED ORAL 2 TIMES DAILY
Qty: 20 TABLET | Refills: 0 | Status: SHIPPED | OUTPATIENT
Start: 2022-08-28 | End: 2022-09-07

## 2022-10-27 ENCOUNTER — E-VISIT (OUTPATIENT)
Dept: TELEHEALTH | Age: 32
End: 2022-10-27

## 2022-10-27 DIAGNOSIS — J03.90 EXUDATIVE TONSILLITIS: Primary | ICD-10-CM

## 2022-10-27 PROCEDURE — 99422 OL DIG E/M SVC 11-20 MIN: CPT | Performed by: NURSE PRACTITIONER

## 2022-10-27 RX ORDER — AMOXICILLIN 875 MG/1
875 TABLET, COATED ORAL 2 TIMES DAILY
Qty: 20 TABLET | Refills: 0 | Status: SHIPPED | OUTPATIENT
Start: 2022-10-27 | End: 2022-11-06

## 2024-05-11 ENCOUNTER — PATIENT MESSAGE (OUTPATIENT)
Dept: FAMILY MEDICINE CLINIC | Facility: CLINIC | Age: 34
End: 2024-05-11

## 2024-05-13 NOTE — TELEPHONE ENCOUNTER
From: Laury Aguilar  To: Morenita Sexton  Sent: 5/11/2024 9:30 PM CDT  Subject: Medical Records    Could you please fax all my previous lab results to [place] Luisa [place] [facility] Fax number: [phone].     Thank you,   Laury Aguilar

## 2024-09-09 NOTE — PROGRESS NOTES
Care Due:                  Date            Visit Type   Department     Provider  --------------------------------------------------------------------------------                                [facility] FAMILY                              PRIMARY      MEDICINE/INTERN  Last Visit: 06-      CARE ([facility])   CURT Mcgraw                              [facility]                              PRIMARY      MEDICINE/INTERN  Next Visit: 12-      CARE ([facility])   AL MATEO Mcgraw                                                            Last  Test          Frequency    Reason                     Performed    Due Date  --------------------------------------------------------------------------------    CMP.........  12 months..  atorvastatin.............  05- 04-    Lipid Panel.  12 months..  atorvastatin.............  05- 04-    [facility] Embedded Care Due Messages. Reference number: [id number].   9/09/2024 3:35:49 PM CDT   Elizabeth Braun is a 28year old female who presents for a complete physical exam, no gyn. HPI:     Patient presents with:  Wellness Visit      Patient feels well, dental visit up to date, no hearing problem. Vaccinations up to date.     Exercise: minim REVIEW OF SYSTEMS:   GENERAL HEALTH: feels well, no fatigue.   SKIN: denies any unusual skin lesions or rashes  EYES: no visual complaints or deficits  HEENT: denies nasal congestion, sinus pain or sore throat; hearing loss negative   RESPIRATORY: denie visit:    Routine general medical examination at a health care facility  Physical activity -30 min of exercise 3-5 days per week   Eat healthy diet that consists of whole grain , fruits , vegetables , lean meats .  Aim for higher servings of fruits and vege

## (undated) NOTE — LETTER
Date: 11/5/2018    Patient Name: Luci Antony          To Whom it may concern: The above patient was seen at the [facility] for treatment of a medical condition. This patient should be excused from attending work on 11/5.      Sincere

## (undated) NOTE — LETTER
Date: 8/28/2022    Patient Name: Nataliya Perdomo          To Whom it may concern: This letter has been written at the patient's request. The above patient was seen at the [facility] for treatment of a medical condition. This patient should be excused from attending work/school from 8/25/22 through 8/29/22. The patient may return to work/school on next scheduled day with the following limitations: None.         Sincerely,    Bethanie Rodriguez, [facility]    [address]

## (undated) NOTE — LETTER
11/12/18        [place] [id number]      Dear Stef Pete,    [address] [facility] records indicate that you have outstanding lab work and or testing that was ordered for you and has not yet been completed:  Orders Placed This Encounter        CBC

## (undated) NOTE — LETTER
Date: 3/26/2019    Patient Name: Viridiana Schwarz          To Whom it may concern:     The above patient was seen at the [facility] for  Physical    The patient is healthy and received a PPD Tuberculin test which will be read 48 to 72 hours fro

## (undated) NOTE — LETTER
Date: 12/11/2018    Patient Name: Anais Mcgee          To Whom it may concern: The above patient was seen at the [facility] for treatment of a medical condition.     This patient should be excused from attending work/school from 12/10/

## (undated) NOTE — LETTER
04/25/19        Viridiana Schwarz  [address] [facility] [address]      Dear Eliel Oliveros,    [facility] records indicate that you have outstanding lab work and or testing that was ordered for you and has not yet been completed:        CBC W/DIFF      COMP METABOLIC PA

## (undated) NOTE — LETTER
Date: 4/29/2019    Patient Name: Moise Goel          To Whom it may concern: The above patient was seen at the [facility] for treatment of a medical condition.     This patient should be excused from attending work/school from 4/28 thr

## (undated) NOTE — Clinical Note
Date: 3/10/2017    Patient Name: Chucky Beck          To Whom it may concern: The above patient was seen at the [facility] for treatment of a medical condition. This patient should be excused from attending work on 3/9/2017.

## (undated) NOTE — MR AVS SNAPSHOT
[address] [id number] [phone]               Thank you for choosing us for your health care visit with Claudia Izaguirre MD.  We are glad to serve you and happy to provide you with this summary of Lipase    Complete by: Mar 06, 2017 (Approximate)    Assoc Dx:  LUQ abdominal pain [R10.12], Diarrhea, unspecified type [R19.7], Irritable bowel syndrome with diarrhea [K58.0]           OVA AND PARASITES    Complete by:   Mar 06, 2017 (Approximate)    Ass or parasite infection is often treated with antibiotics. Diarrhea caused by other factors, such as a stomach virus, often improves with simple home treatment. The tips below may also help relieve your symptoms. · Drink plenty of fluids.  This helps prevent · Food intolerance, such as to lactose, the sugar found in milk and milk products  · Reaction to medicines like antibiotics, laxatives, cancer drugs, and antacids  Along with diarrhea, you may also have:  · Abdominal pain and cramping  · Nausea and vomitin · Remember that washing with soap and water and using alcohol-based  is the best way to prevent the spread of infection. · Clean the toilet after each use. · Wash your hands before eating. · Wash your hands before and after preparing food.  Keep · Plain noodles, rice, mashed potatoes, chicken noodle soup, or rice soup  · Unsweetened canned fruit (no pineapple)  · Bananas  As you recover:  · Limit fat intake to less than 15 grams per day.  Don’t eat margarine, butter, oils, mayonnaise, sauces, Oanh Bodo Eating a low-fiber diet means eating foods that don’t have much fiber. These foods are easy to digest.  Most of the fiber that you eat passes undigested through your bowel. This is what forms stool.  Low-fiber foods can help to slow down your bowel movement Milk and dairy (2 servings daily)  · What to choose: milk, buttermilk; yogurt or ice cream without seeds or nuts; custard or pudding; sour cream; cheese and cottage cheese  · What to avoid: ice cream and yogurt with seeds, nuts, or fruit chunks  Fruit (2 t alcohol (ask your provider); marmalade, jam, and preserves; desserts that have seeds, nuts, coconut, dried fruit, whole grains or bran; candy that has seeds or nuts; drinks sweetened with sorbitol or other sugar substitutes; caffeinated drinks, including t and this prescription was added. Make sure you understand how and when to take each. Commonly known as:  ADDERALL   Start taking on:  5/5/2017           Dicyclomine HCl 10 MG Caps   Take 1 capsule (10 mg total) by mouth 3 (three) times daily as needed. EAT THESE FOODS MORE OFTEN: EAT THESE FOODS LESS OFTEN:   Make half your plate fruits and vegetables Highly refined, white starches including white bread, rice and pasta   Eat plenty of protein, keep the fat content low Sugars:  sodas and sports drinks, ca